# Patient Record
Sex: MALE | Race: WHITE | NOT HISPANIC OR LATINO | Employment: FULL TIME | URBAN - NONMETROPOLITAN AREA
[De-identification: names, ages, dates, MRNs, and addresses within clinical notes are randomized per-mention and may not be internally consistent; named-entity substitution may affect disease eponyms.]

---

## 2018-06-08 ENCOUNTER — HOSPITAL ENCOUNTER (OUTPATIENT)
Dept: LAB | Facility: MEDICAL CENTER | Age: 58
End: 2018-06-08
Attending: UROLOGY
Payer: COMMERCIAL

## 2018-06-08 LAB — PSA SERPL-MCNC: 0.4 NG/ML (ref 0–4)

## 2018-06-08 PROCEDURE — 84153 ASSAY OF PSA TOTAL: CPT

## 2018-06-08 PROCEDURE — 36415 COLL VENOUS BLD VENIPUNCTURE: CPT

## 2019-02-08 ENCOUNTER — HOSPITAL ENCOUNTER (OUTPATIENT)
Dept: LAB | Facility: MEDICAL CENTER | Age: 59
End: 2019-02-08
Attending: UROLOGY
Payer: COMMERCIAL

## 2019-02-08 LAB
FSH SERPL-ACNC: 5 MIU/ML (ref 1.5–12.4)
LH SERPL-ACNC: 4 IU/L (ref 1.7–8.6)
PROLACTIN SERPL-MCNC: 4.17 NG/ML (ref 2.1–17.7)
TESTOST SERPL-MCNC: 385 NG/DL (ref 175–781)
TSH SERPL DL<=0.005 MIU/L-ACNC: 1.04 UIU/ML (ref 0.38–5.33)

## 2019-02-08 PROCEDURE — 83002 ASSAY OF GONADOTROPIN (LH): CPT

## 2019-02-08 PROCEDURE — 84443 ASSAY THYROID STIM HORMONE: CPT

## 2019-02-08 PROCEDURE — 36415 COLL VENOUS BLD VENIPUNCTURE: CPT

## 2019-02-08 PROCEDURE — 84146 ASSAY OF PROLACTIN: CPT

## 2019-02-08 PROCEDURE — 83001 ASSAY OF GONADOTROPIN (FSH): CPT

## 2019-02-08 PROCEDURE — 84403 ASSAY OF TOTAL TESTOSTERONE: CPT

## 2022-11-14 ENCOUNTER — NON-PROVIDER VISIT (OUTPATIENT)
Dept: URGENT CARE | Facility: PHYSICIAN GROUP | Age: 62
End: 2022-11-14

## 2022-11-14 DIAGNOSIS — Z02.1 PRE-EMPLOYMENT DRUG SCREENING: ICD-10-CM

## 2022-11-14 PROCEDURE — 8907 PR URINE COLLECT ONLY: Performed by: NURSE PRACTITIONER

## 2023-04-24 ENCOUNTER — HOSPITAL ENCOUNTER (OUTPATIENT)
Dept: LAB | Facility: MEDICAL CENTER | Age: 63
End: 2023-04-24
Attending: UROLOGY
Payer: COMMERCIAL

## 2023-04-24 LAB — PSA SERPL-MCNC: 0.47 NG/ML (ref 0–4)

## 2023-04-24 PROCEDURE — 36415 COLL VENOUS BLD VENIPUNCTURE: CPT

## 2023-04-24 PROCEDURE — 84153 ASSAY OF PSA TOTAL: CPT

## 2023-07-11 ENCOUNTER — OFFICE VISIT (OUTPATIENT)
Dept: URGENT CARE | Facility: PHYSICIAN GROUP | Age: 63
End: 2023-07-11

## 2023-07-11 VITALS
DIASTOLIC BLOOD PRESSURE: 80 MMHG | SYSTOLIC BLOOD PRESSURE: 136 MMHG | BODY MASS INDEX: 29.26 KG/M2 | WEIGHT: 216 LBS | TEMPERATURE: 97.5 F | RESPIRATION RATE: 14 BRPM | HEART RATE: 79 BPM | OXYGEN SATURATION: 95 % | HEIGHT: 72 IN

## 2023-07-11 DIAGNOSIS — Z02.89 ENCOUNTER FOR EXAMINATION REQUIRED BY DEPARTMENT OF TRANSPORTATION (DOT): ICD-10-CM

## 2023-07-11 PROCEDURE — 3079F DIAST BP 80-89 MM HG: CPT | Performed by: PHYSICIAN ASSISTANT

## 2023-07-11 PROCEDURE — 7100 PR DOT PHYSICAL: Performed by: PHYSICIAN ASSISTANT

## 2023-07-11 PROCEDURE — 3075F SYST BP GE 130 - 139MM HG: CPT | Performed by: PHYSICIAN ASSISTANT

## 2023-07-11 RX ORDER — FINASTERIDE 5 MG/1
2.5 TABLET, FILM COATED ORAL
COMMUNITY
Start: 2023-06-17

## 2023-07-11 RX ORDER — TAMSULOSIN HYDROCHLORIDE 0.4 MG/1
0.4 CAPSULE ORAL 2 TIMES DAILY
COMMUNITY
Start: 2023-06-17

## 2023-07-11 NOTE — PROGRESS NOTES
Here for [facility] CMV medical examination.  Reviewed the patient's past medical and surgical history.  The patient's physical exam today in clinic was normal.  Meets criteria for two-year certificate.  See scanned documentation in media for further details.

## 2024-04-10 ENCOUNTER — HOSPITAL ENCOUNTER (OUTPATIENT)
Dept: LAB | Facility: MEDICAL CENTER | Age: 64
End: 2024-04-10
Attending: NURSE PRACTITIONER
Payer: COMMERCIAL

## 2024-04-10 LAB — PSA SERPL-MCNC: 0.38 NG/ML (ref 0–4)

## 2024-04-10 PROCEDURE — 36415 COLL VENOUS BLD VENIPUNCTURE: CPT

## 2024-04-10 PROCEDURE — 84153 ASSAY OF PSA TOTAL: CPT
